# Patient Record
Sex: MALE | Race: WHITE | HISPANIC OR LATINO | Employment: STUDENT | URBAN - METROPOLITAN AREA
[De-identification: names, ages, dates, MRNs, and addresses within clinical notes are randomized per-mention and may not be internally consistent; named-entity substitution may affect disease eponyms.]

---
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
## 2023-01-05 ENCOUNTER — EVALUATION (OUTPATIENT)
Dept: PHYSICAL THERAPY | Facility: MEDICAL CENTER | Age: 18
End: 2023-01-05

## 2023-01-05 DIAGNOSIS — M25.512 LEFT SHOULDER PAIN, UNSPECIFIED CHRONICITY: Primary | ICD-10-CM

## 2023-01-05 NOTE — PROGRESS NOTES
PT Evaluation     Today's date: 2023  Patient name: Kin De La Vega  : 2005  MRN: [id number]  Referring provider: Violeta Roldan*  Dx:   Encounter Diagnosis     ICD-10-CM    1  Left shoulder pain, unspecified chronicity  M25 512                      Assessment  Assessment details: Pt is a pleasant 22 yo male presenting to physical therapy with L shoulder/thoracic pain  Pt responded very well to thoracic mobilization and had significantly less discomfort post tx  Pt does not require further skilled PT at this time, but would benefit from continuation with and I HEP  Goals  Pt I with HEP  Met    Plan  Plan details: Pt to contact me with further questions or concerns  Subjective Evaluation    History of Present Illness  Mechanism of injury: Pt reports about 2 weeks ago he was rock climbing and a few hours afterward he started to get pain in his R shoulder blade  Pt notes that he should have been taking a day off on the day it happened  Pt has less pain overall since it happened  He has the most pain when he wakes up in the morning or in the beginning of his workouts  Pain  Current pain rating: 3  At best pain ratin  At worst pain ratin    Social Support    Working: student    Exercise history: wt training      Diagnostic Tests  No diagnostic tests performed  Treatments  No previous or current treatments  Patient Goals  Patient goals for therapy: decreased pain and return to sport/leisure activities          Objective     Postural Observations    Additional Postural Observation Details  + scapular protraction, anterior tilting and winging B    + hypomobility upper thoracic spine    Cervical/Thoracic Screen   Cervical range of motion within normal limits  Thoracic range of motion within normal limits    Neurological Testing     Sensation     Shoulder   Left Shoulder   Intact: light touch    Right Shoulder   Intact: light touch    Active Range of Motion   Left Shoulder Normal active range of motion    Right Shoulder   Normal active range of motion    Additional Active Range of Motion Details  + mild ST dyskinesis B     Passive Range of Motion   Left Shoulder   Normal passive range of motion    Right Shoulder   Normal passive range of motion    Joint Play   Left Shoulder  Joints within functional limits are the anterior capsule, posterior capsule and inferior capsule  Right Shoulder  Joints within functional limits are the anterior capsule, posterior capsule and inferior capsule  Strength/Myotome Testing     Left Shoulder     Planes of Motion   Flexion: 5   Abduction: 5   External rotation at 0°: 5   Internal rotation at 0°: 5     Right Shoulder     Planes of Motion   Flexion: 5   Abduction: 5   External rotation at 0°: 5   Internal rotation at 0°: 5     Additional Strength Details  Prone horizontal abduction with palm down: R 4+/5 L 4+/5  Prone horizontal abduction with thumb up: R 4+/5 L 4+/5  Prone flexion: R 4+/5 L 4+/5    Elbow flexion and extension:  5/5 B    Scaption and empty can:  5/5 B    Tests     Left Shoulder   Negative scapular relocation  Right Shoulder   Negative scapular relocation                Precautions: None       Manuals 1/5            Thoracic mobs HK                                                   Ther Ex 1/5            HEP review HK

## 2023-08-03 ENCOUNTER — EVALUATION (OUTPATIENT)
Dept: PHYSICAL THERAPY | Facility: MEDICAL CENTER | Age: 18
End: 2023-08-03
Payer: COMMERCIAL

## 2023-08-03 DIAGNOSIS — M25.512 LEFT SHOULDER PAIN, UNSPECIFIED CHRONICITY: Primary | ICD-10-CM

## 2023-08-03 PROCEDURE — 97161 PT EVAL LOW COMPLEX 20 MIN: CPT | Performed by: PHYSICAL THERAPIST

## 2023-08-03 NOTE — PROGRESS NOTES
PT Evaluation     Today's date: 8/3/2023  Patient name: Steve Johnson  : 2005  MRN: [id number]  Referring provider: Janel Devi*  Dx:   Encounter Diagnosis     ICD-10-CM    1. Left shoulder pain, unspecified chronicity  M25.512                      Assessment  Assessment details: Pt is a pleasant 22 yo male presenting to physical therapy with L shoulder/UT pain. Pt would benefit from I HEP. No further skilled PT indicated unless sxs do not resolve. Understanding of Dx/Px/POC: good   Prognosis: good    Goals  Functional Goals  - Patient will be independent with comprehensive HEP Met            Plan  Plan details: All questions answered today and patient will be seen prn if sxs do not resolve. Patient would benefit from: skilled physical therapy  Other planned modality interventions: Modalities prn   Planned therapy interventions: manual therapy, neuromuscular re-education, patient education, postural training, strengthening, stretching, therapeutic activities, therapeutic exercise and home exercise program  Frequency: 1x week  Duration in weeks: 4  Treatment plan discussed with: patient        Subjective Evaluation    History of Present Illness  Mechanism of injury: Pt reports that a few weeks ago he was doing dips and felt pain in his L shoulder/UT. Pt states that he took few days off and when he returned to doing bench press he tweaked it again. When he has pain from lifting he has consistent pain, but generally he is not having regular pain with normal activities.     Patient Goals  Patient goals for therapy: decreased pain and return to sport/leisure activities    Pain  Current pain ratin  At best pain ratin  At worst pain ratin    Social Support    Working: Student   Exercise history: wt training       Diagnostic Tests  No diagnostic tests performed  Treatments  No previous or current treatments        Objective     Postural Observations    Additional Postural Observation Details  + scapular protraction, anterior tilting and winging B    Cervical/Thoracic Screen   Cervical range of motion within normal limits  Thoracic range of motion within normal limits    Neurological Testing     Sensation     Shoulder   Left Shoulder   Intact: light touch    Right Shoulder   Intact: light touch    Active Range of Motion     Additional Active Range of Motion Details  Pt demonstrates full AROM B shoulder flexion and abduction, but demonstrates significant dyskinesis with these movements B worse on the left. Strength/Myotome Testing     Left Shoulder     Planes of Motion   Flexion: 5   Abduction: 5   External rotation at 0°: 5   Internal rotation at 0°: 5     Right Shoulder     Planes of Motion   Flexion: 5   Abduction: 5   External rotation at 0°: 5   Internal rotation at 0°: 5     Additional Strength Details  Prone horizontal abduction with palm down: R 3+/5 L 3+/5  Prone horizontal abduction with thumb up: R 3+/5 L 3+/5  Prone flexion: R 3+/5 L 3+/5    Elbow flexion and extension:  5/5 B    Scaption and empty can: 5/5 B    Tests     Left Shoulder   Positive scapular relocation . Right Shoulder   Positive scapular relocation.      General Comments:      Shoulder Comments   + hypomobility thoracic spine              Precautions: None      Manuals 8/3            Thoracic mobs HK                                                   Ther Ex 8/3            Scap 4 with and without bands x10  ea

## 2024-08-01 ENCOUNTER — EVALUATION (OUTPATIENT)
Dept: PHYSICAL THERAPY | Facility: MEDICAL CENTER | Age: 19
End: 2024-08-01
Payer: COMMERCIAL

## 2024-08-01 DIAGNOSIS — M25.512 LEFT SHOULDER PAIN, UNSPECIFIED CHRONICITY: Primary | ICD-10-CM

## 2024-08-01 DIAGNOSIS — M25.312 INSTABILITY OF LEFT SHOULDER JOINT: ICD-10-CM

## 2024-08-01 PROCEDURE — 97140 MANUAL THERAPY 1/> REGIONS: CPT | Performed by: PHYSICAL THERAPIST

## 2024-08-01 PROCEDURE — 97161 PT EVAL LOW COMPLEX 20 MIN: CPT | Performed by: PHYSICAL THERAPIST

## 2024-08-01 NOTE — PROGRESS NOTES
PT Evaluation     Today's date: 2024  Patient name: Orion Black  : 2005  MRN: [id number]  Referring provider: Jose Pitt*  Dx:   Encounter Diagnosis     ICD-10-CM    1. Left shoulder pain, unspecified chronicity  M25.512       2. 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  Pt would benefit from skilled PT to address current impairments and return pt to pre-morbid function.    Understanding of Dx/Px/POC: good     Prognosis: good    Goals  Impairment Goals  - Pt I with initial HEP in 1-2 visits  - Improve ROM equal to contralateral side in 6 weeks  - Increase strength to 5/5 in all affected areas in 8+ weeks    Functional Goals  - Increase FOTO to at least 66 in 16+ weeks   - Patient will be independent with comprehensive HEP in 16+ weeks  - Patient will be able to reach for object from shelf at shoulder height with min reports of difficulty in 8 weeks  - Patient will be able to reach for object overhead with min to difficulty in 10+ weeks  - Patient will be able to lift/carry objects without provocation of symptoms in 16+ weeks  - Patient will be able to return to pre-morbid function with min provocation of sxs in 16+ weeks             Plan  Patient would benefit from: skilled physical therapy  Other planned modality interventions: Modalities prn    Planned therapy interventions: manual therapy, neuromuscular re-education, postural training, strengthening, stretching, therapeutic exercise, therapeutic activities and home exercise program    Frequency: 2x week  Duration in weeks: 16  Treatment plan discussed with: patient and family        Subjective Evaluation    History of Present Illness  Date of surgery: 2024  Mechanism of injury: Pt reports continued problems and pain with his L shoulder.  Pt decided to proceed with a post  labral repair.   Patient Goals  Patient goals for therapy: decreased pain, increased strength, increased motion and return to sport/leisure activities    Pain  Current pain rating: 3  At best pain rating: 3  At worst pain ratin    Social Support    Working: Student.  Exercise history: weight lifting          Objective     Postural Observations    Additional Postural Observation Details  Pt presents to physical therapy with a sling on his L shoulder    Pt with multiple healing surgical wounds closed with steri-strips    + mild swelling L shoulder     Cervical/Thoracic Screen   Cervical range of motion within normal limits  Thoracic range of motion within normal limits    Neurological Testing     Sensation     Shoulder   Left Shoulder   Intact: light touch    Right Shoulder   Intact: Light touch    Active Range of Motion     Right Shoulder   Normal active range of motion    Additional Active Range of Motion Details  L shoulder NT secondary to recency of surgery     B elbow, wrist and hand AROM is WNL all planes     Passive Range of Motion   Left Shoulder   Flexion: 45 degrees   Abduction: 45 degrees   External rotation 45°: 20 degrees   Internal rotation 45°: 15 degrees     Right Shoulder   Normal passive range of motion    Strength/Myotome Testing     Right Shoulder     Planes of Motion   Flexion: 5   Abduction: 5   External rotation at 0°: 5   Internal rotation at 0°: 5     Additional Strength Details  L shoulder NT secondary to recency of surgery              Precautions: s/p L post labral repair       Manuals             PROM HK                                                   Ther Activity             Post op HEP IP                                                                Modalities              15'

## 2024-08-05 ENCOUNTER — OFFICE VISIT (OUTPATIENT)
Dept: PHYSICAL THERAPY | Facility: MEDICAL CENTER | Age: 19
End: 2024-08-05
Payer: COMMERCIAL

## 2024-08-05 DIAGNOSIS — M25.312 INSTABILITY OF LEFT SHOULDER JOINT: ICD-10-CM

## 2024-08-05 DIAGNOSIS — M25.512 LEFT SHOULDER PAIN, UNSPECIFIED CHRONICITY: Primary | ICD-10-CM

## 2024-08-05 PROCEDURE — 97140 MANUAL THERAPY 1/> REGIONS: CPT | Performed by: PHYSICAL THERAPIST

## 2024-08-05 NOTE — PROGRESS NOTES
Daily Note     Today's date: 2024  Patient name: Orion Black  : 2005  MRN: [id number]  Referring provider: Jose Pitt*  Dx:   Encounter Diagnosis     ICD-10-CM    1. Left shoulder pain, unspecified chronicity  M25.512       2. 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  He is practicing relaxing his L UE more.         Objective: See treatment diary below      Assessment: Tolerated treatment well. Patient would benefit from continued PT      Plan: Continue per plan of care.      Precautions: s/p L post labral repair       Manuals            PROM HK HK                                                  Ther Activity            Post op HEP IP Rev                                                               Modalities             15' 15'

## 2024-08-08 ENCOUNTER — OFFICE VISIT (OUTPATIENT)
Dept: PHYSICAL THERAPY | Facility: MEDICAL CENTER | Age: 19
End: 2024-08-08
Payer: COMMERCIAL

## 2024-08-08 DIAGNOSIS — M25.312 INSTABILITY OF LEFT SHOULDER JOINT: ICD-10-CM

## 2024-08-08 DIAGNOSIS — M25.512 LEFT SHOULDER PAIN, UNSPECIFIED CHRONICITY: Primary | ICD-10-CM

## 2024-08-08 PROCEDURE — 97140 MANUAL THERAPY 1/> REGIONS: CPT

## 2024-08-08 NOTE — PROGRESS NOTES
Daily Note     Today's date: 2024  Patient name: Orion Black  : 2005  MRN: [id number]  Referring provider: Jose Pitt*  Dx:   Encounter Diagnosis     ICD-10-CM    1. Left shoulder pain, unspecified chronicity  M25.512       2. 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       Objective: See treatment diary below      Assessment: Tolerated treatment well. Patient  was able to make good progress in his mobility according to protocol.  Pt would benefit from continued PT.      Plan: Continue per plan of care.      Precautions: s/p L post labral repair       Manuals           PROM HK HK KO                                                 Ther Activity            Post op HEP IP Rev                                                               Modalities            15' 15' 15'

## 2024-08-12 ENCOUNTER — OFFICE VISIT (OUTPATIENT)
Dept: PHYSICAL THERAPY | Facility: MEDICAL CENTER | Age: 19
End: 2024-08-12
Payer: COMMERCIAL

## 2024-08-12 DIAGNOSIS — M25.312 INSTABILITY OF LEFT SHOULDER JOINT: ICD-10-CM

## 2024-08-12 DIAGNOSIS — M25.512 LEFT SHOULDER PAIN, UNSPECIFIED CHRONICITY: Primary | ICD-10-CM

## 2024-08-12 PROCEDURE — 97140 MANUAL THERAPY 1/> REGIONS: CPT

## 2024-08-12 NOTE — PROGRESS NOTES
Daily Note     Today's date: 2024  Patient name: Orion Black  : 2005  MRN: [id number]  Referring provider: Jose Pitt*  Dx:   Encounter Diagnosis     ICD-10-CM    1. Left shoulder pain, unspecified chronicity  M25.512       2. 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        Objective: See treatment diary below      Assessment: Tolerated treatment well. Patient  is making good progress in his mobility.  Pt would benefit from continued PT.       Plan: Continue per plan of care.      Precautions: s/p L post labral repair       Manuals          PROM HK HK KO KO                                                Ther Activity            Post op HEP IP Rev                                                               Modalities           15' 15' 15' 15'

## 2024-08-15 ENCOUNTER — OFFICE VISIT (OUTPATIENT)
Dept: PHYSICAL THERAPY | Facility: MEDICAL CENTER | Age: 19
End: 2024-08-15
Payer: COMMERCIAL

## 2024-08-15 DIAGNOSIS — M25.512 LEFT SHOULDER PAIN, UNSPECIFIED CHRONICITY: Primary | ICD-10-CM

## 2024-08-15 DIAGNOSIS — M25.312 INSTABILITY OF LEFT SHOULDER JOINT: ICD-10-CM

## 2024-08-15 PROCEDURE — 97140 MANUAL THERAPY 1/> REGIONS: CPT | Performed by: PHYSICAL THERAPIST

## 2024-08-15 NOTE — PROGRESS NOTES
Daily Note     Today's date: 8/15/2024  Patient name: Orion Black  : 2005  MRN: [id number]  Referring provider: Jose Pitt*  Dx:   Encounter Diagnosis     ICD-10-CM    1. Left shoulder pain, unspecified chronicity  M25.512       2. 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      Objective: See treatment diary below      Assessment: Tolerated treatment well. Patient demonstrated fatigue post treatment, exhibited good technique with therapeutic exercises, and would benefit from continued PT      Plan: Continue per plan of care.      Precautions: s/p L post labral repair       Manuals 8/1 8/5 8/8 8/12 8/15        PROM HK HK KO KO HK                                               Ther Activity            Post op HEP IP Rev                                                               Modalities 8/1 8/5 8/8 8/12 8/15        MH 15' 15' 15' 15' 15'

## 2024-08-21 ENCOUNTER — OFFICE VISIT (OUTPATIENT)
Dept: PHYSICAL THERAPY | Facility: MEDICAL CENTER | Age: 19
End: 2024-08-21
Payer: COMMERCIAL

## 2024-08-21 DIAGNOSIS — M25.312 INSTABILITY OF LEFT SHOULDER JOINT: ICD-10-CM

## 2024-08-21 DIAGNOSIS — M25.512 LEFT SHOULDER PAIN, UNSPECIFIED CHRONICITY: Primary | ICD-10-CM

## 2024-08-21 PROCEDURE — 97140 MANUAL THERAPY 1/> REGIONS: CPT

## 2024-08-21 NOTE — PROGRESS NOTES
Daily Note     Today's date: 2024  Patient name: Orion Black  : 2005  MRN: [id number]  Referring provider: Jose Pitt*  Dx:   Encounter Diagnosis     ICD-10-CM    1. Left shoulder pain, unspecified chronicity  M25.512       2. 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       Objective: See treatment diary below      Assessment: Tolerated treatment well. Patient  displays good mobility and is making progress according to protocol.  Pt would benefit from continued PT.       Plan: Continue per plan of care.      Precautions: s/p L post labral repair       Manuals 8/1 8/5 8/8 8/12 8/15 8/21       PROM HK HK KO KO HK KO                                              Ther Activity            Post op HEP IP Rev                                                               Modalities 8/1 8/5 8/8 8/12 8/15 8/21        15' 15' 15' 15' 15' 15'

## 2024-08-23 ENCOUNTER — OFFICE VISIT (OUTPATIENT)
Dept: PHYSICAL THERAPY | Facility: MEDICAL CENTER | Age: 19
End: 2024-08-23
Payer: COMMERCIAL

## 2024-08-23 DIAGNOSIS — M25.312 INSTABILITY OF LEFT SHOULDER JOINT: ICD-10-CM

## 2024-08-23 DIAGNOSIS — M25.512 LEFT SHOULDER PAIN, UNSPECIFIED CHRONICITY: Primary | ICD-10-CM

## 2024-08-23 PROCEDURE — 97140 MANUAL THERAPY 1/> REGIONS: CPT

## 2024-08-23 NOTE — PROGRESS NOTES
Daily Note     Today's date: 2024  Patient name: Orion Black  : 2005  MRN: [id number]  Referring provider: Jose Pitt*  Dx:   Encounter Diagnosis     ICD-10-CM    1. Left shoulder pain, unspecified chronicity  M25.512       2. 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       Objective: See treatment diary below      Assessment: Tolerated treatment well. Patient  displays good mobility and is making steady progress within protocol guidelines.  Pt is to continue PT while away at college after todays visit.       Plan:  Pt is dc'd to hep and PT at college.      Precautions: s/p L post labral repair       Manuals 8/1 8/5 8/8 8/12 8/15 8/21 8/23      PROM HK HK KO KO HK KO KO                                             Ther Activity            Post op HEP IP Rev                                                               Modalities 8/1 8/5 8/8 8/12 8/15 8/21 8/23       15' 15' 15' 15' 15' 15' 15'